# Patient Record
Sex: FEMALE | Race: WHITE | NOT HISPANIC OR LATINO | URBAN - METROPOLITAN AREA
[De-identification: names, ages, dates, MRNs, and addresses within clinical notes are randomized per-mention and may not be internally consistent; named-entity substitution may affect disease eponyms.]

---

## 2018-01-31 ENCOUNTER — OFFICE (OUTPATIENT)
Dept: URBAN - METROPOLITAN AREA CLINIC 75 | Facility: CLINIC | Age: 23
End: 2018-01-31

## 2018-01-31 VITALS
WEIGHT: 157 LBS | HEART RATE: 77 BPM | SYSTOLIC BLOOD PRESSURE: 130 MMHG | HEIGHT: 66 IN | DIASTOLIC BLOOD PRESSURE: 76 MMHG

## 2018-01-31 DIAGNOSIS — R14.0 ABDOMINAL DISTENSION (GASEOUS): ICD-10-CM

## 2018-01-31 DIAGNOSIS — R10.10 UPPER ABDOMINAL PAIN, UNSPECIFIED: ICD-10-CM

## 2018-01-31 PROCEDURE — 99204 OFFICE O/P NEW MOD 45 MIN: CPT | Performed by: INTERNAL MEDICINE

## 2018-01-31 RX ORDER — HYOSCYAMINE SULFATE 0.12 MG/1
TABLET ORAL
Qty: 60 | Refills: 10 | Status: ACTIVE
Start: 2018-01-31

## 2021-02-10 ENCOUNTER — OFFICE VISIT (OUTPATIENT)
Dept: INTERNAL MEDICINE | Facility: CLINIC | Age: 26
End: 2021-02-10

## 2021-02-10 VITALS
WEIGHT: 188 LBS | HEIGHT: 66 IN | BODY MASS INDEX: 30.22 KG/M2 | TEMPERATURE: 97.8 F | SYSTOLIC BLOOD PRESSURE: 110 MMHG | DIASTOLIC BLOOD PRESSURE: 70 MMHG

## 2021-02-10 DIAGNOSIS — M25.50 ARTHRALGIA, UNSPECIFIED JOINT: Primary | ICD-10-CM

## 2021-02-10 PROBLEM — F41.9 ANXIETY: Status: ACTIVE | Noted: 2021-02-10

## 2021-02-10 PROCEDURE — 99203 OFFICE O/P NEW LOW 30 MIN: CPT | Performed by: PHYSICIAN ASSISTANT

## 2021-02-10 RX ORDER — FERROUS SULFATE 325(65) MG
325 TABLET ORAL DAILY
COMMUNITY
Start: 2020-12-25 | End: 2021-03-29 | Stop reason: SDUPTHER

## 2021-02-10 RX ORDER — FLUOXETINE HYDROCHLORIDE 40 MG/1
CAPSULE ORAL
COMMUNITY
Start: 2021-02-08 | End: 2021-03-29 | Stop reason: SDUPTHER

## 2021-02-10 NOTE — PROGRESS NOTES
Subjective   Chief Complaint   Patient presents with   • Establish Care     NP       History of Present Illness     Pt is a 25 yr old white female with anxiety, depression and iron deficiency anemia. She is taking sprintec daily and had a pap smear in January of 2020 that was normal. Her periods are normal and regular. She is taking Prozac 40 mg daily and her symptoms are well controlled.     She takes otc iron daily as well for iron deficiency anemia.     End of 2018 early 2019 started having joint pain and fatigue. She then had some painful red nodules on her skin that were warm to the touch. She never had the rash on her torso stayed on her upper and lower extremities. She had worsening joint pain with this and was taken to the ER she was hospitalized and treated with steroids and it resolved and then came back and was much worse she had this relapsing remitting pattern for about 4 months.     She saw rheumatology, Dr. Fulton,  and they did not think it was lupus or RA.    She had several physicians tell her she had erythema nodosum.     She saw Dr. Urban, dermatology, but they were never able to get her into the office to do a biopsy at the time of the rash.     She continues to have joint pain and occasional rash that appears. She has fatigue no matter how much sleep she gets. She has not seen rheum in a year.     Patient Active Problem List   Diagnosis   • Anxiety       Allergies   Allergen Reactions   • Spironolactone Hives and Diarrhea       Current Outpatient Medications on File Prior to Visit   Medication Sig Dispense Refill   • ferrous sulfate 325 (65 FE) MG tablet Take 325 mg by mouth Daily.     • FLUoxetine (PROzac) 40 MG capsule TAKE 1 CAPSULE DAILY. TAKE WITH 20 MG FOR A DAILY DOSEOF 60 MG.     • Sprintec 28 0.25-35 MG-MCG per tablet        No current facility-administered medications on file prior to visit.        History reviewed. No pertinent past medical history.    Family History   Problem  "Relation Age of Onset   • Hypertension Mother    • Hyperlipidemia Mother    • Thyroid disease Mother    • Hypertension Father    • Hyperlipidemia Father    • Thyroid disease Maternal Aunt    • Cancer Maternal Grandmother         breast   • Alzheimer's disease Maternal Grandmother         60's   • Cancer Maternal Grandfather         thyroid       Social History     Socioeconomic History   • Marital status: Single     Spouse name: Not on file   • Number of children: Not on file   • Years of education: Not on file   • Highest education level: Not on file   Tobacco Use   • Smoking status: Never Smoker   Substance and Sexual Activity   • Alcohol use: Never     Frequency: Never   • Drug use: Never   • Sexual activity: Defer       History reviewed. No pertinent surgical history.      The following portions of the patient's history were reviewed and updated as appropriate: problem list, allergies, current medications, past medical history, past family history, past social history and past surgical history.    Review of Systems   Constitution: Positive for malaise/fatigue.   Skin: Positive for rash.   Musculoskeletal: Positive for joint pain, joint swelling and stiffness.         There is no immunization history on file for this patient.    Objective   Vitals:    02/10/21 0831 02/10/21 0921   BP:  110/70   Temp: 97.8 °F (36.6 °C)    Weight: 85.3 kg (188 lb)    Height: 167.6 cm (66\")      Body mass index is 30.34 kg/m².  Physical Exam  Vitals signs reviewed.   Constitutional:       Appearance: Normal appearance.   HENT:      Head: Normocephalic and atraumatic.   Eyes:      Extraocular Movements: Extraocular movements intact.      Conjunctiva/sclera: Conjunctivae normal.      Pupils: Pupils are equal, round, and reactive to light.   Cardiovascular:      Rate and Rhythm: Normal rate and regular rhythm.      Heart sounds: Normal heart sounds.   Pulmonary:      Effort: Pulmonary effort is normal.      Breath sounds: Normal breath " sounds.   Neurological:      Mental Status: She is alert.   Psychiatric:         Mood and Affect: Mood normal.         Behavior: Behavior normal.         Thought Content: Thought content normal.         Judgment: Judgment normal.       Assessment/Plan   Diagnoses and all orders for this visit:    1. Arthralgia, unspecified joint (Primary)  -     Ambulatory Referral to Rheumatology    I am going to refer her to Dr. Swetha Callaway for second opinion of her ongoing arthralgias and rash.

## 2021-03-29 RX ORDER — FERROUS SULFATE 325(65) MG
325 TABLET ORAL DAILY
Qty: 30 TABLET | Refills: 11 | Status: SHIPPED | OUTPATIENT
Start: 2021-03-29 | End: 2021-04-05 | Stop reason: SDUPTHER

## 2021-03-29 RX ORDER — FLUOXETINE HYDROCHLORIDE 40 MG/1
40 CAPSULE ORAL DAILY
Qty: 30 CAPSULE | Refills: 2 | Status: SHIPPED | OUTPATIENT
Start: 2021-03-29 | End: 2021-04-21 | Stop reason: SDUPTHER

## 2021-03-29 NOTE — TELEPHONE ENCOUNTER
Caller: Paloma Enriquez    Relationship: Self    Best call back number: [phone]     Medication needed:   Requested Prescriptions     Pending Prescriptions Disp Refills   • ferrous sulfate 325 (65 FE) MG tablet 30 tablet 11     Sig: Take 1 tablet by mouth Daily.   • FLUoxetine (PROzac) 40 MG capsule         When do you need the refill by: 03/30/21    What additional details did the patient provide when requesting the medication: GETS SHIPPED THROUGH MAIL HAS ABOUT A WEEK FOR THE FLUOXETINE AND 4 DAYS ON THE FERROUS.    Does the patient have less than a 3 day supply:  [] Yes  [x] No    What is the patient's preferred pharmacy: [facility] - [place], [place] - [address] AT PORTAL TO REGISTERED [facility] - [phone]  - [phone] FX       PATIENT IS ALSO ASKING ABOUT THE REFERRAL FOR THE RHEUMATOLOGY IT HAS BEEN OVER A MONTH AND SHE STILL HAS NOT HEARD ANYTHING ABOUT THE REFERRAL OR APPT.

## 2021-04-05 RX ORDER — FERROUS SULFATE 325(65) MG
325 TABLET ORAL DAILY
Qty: 30 TABLET | Refills: 11 | Status: SHIPPED | OUTPATIENT
Start: 2021-04-05 | End: 2022-04-27

## 2021-04-05 NOTE — TELEPHONE ENCOUNTER
Caller: HAN GUTIERREZ    Relationship: Mother    Best call back number:[phone]    Medication needed:   Requested Prescriptions     Pending Prescriptions Disp Refills   • ferrous sulfate 325 (65 FE) MG tablet 30 tablet 11     Sig: Take 1 tablet by mouth Daily.       When do you need the refill by: ASAP    What additional details did the patient provide when requesting the medication: PATIENT REQUESTED THIS THROUGH MAIL ORDER BUT THEY WOULD NOT FILL IT. PATIENT IS OUT OF MEDS.     Does the patient have less than a 3 day supply:  [x] Yes  [] No    What is the patient's preferred pharmacy: [address] - [phone]  - [phone] FX

## 2021-04-16 ENCOUNTER — BULK ORDERING (OUTPATIENT)
Dept: CASE MANAGEMENT | Facility: OTHER | Age: 26
End: 2021-04-16

## 2021-04-16 DIAGNOSIS — Z23 IMMUNIZATION DUE: ICD-10-CM

## 2021-04-21 DIAGNOSIS — F41.9 ANXIETY: Primary | ICD-10-CM

## 2021-04-21 RX ORDER — FLUOXETINE HYDROCHLORIDE 40 MG/1
40 CAPSULE ORAL DAILY
Qty: 90 CAPSULE | Refills: 1 | Status: SHIPPED | OUTPATIENT
Start: 2021-04-21 | End: 2021-09-14

## 2021-05-12 ENCOUNTER — OFFICE VISIT (OUTPATIENT)
Dept: INTERNAL MEDICINE | Facility: CLINIC | Age: 26
End: 2021-05-12

## 2021-05-12 VITALS
WEIGHT: 185 LBS | SYSTOLIC BLOOD PRESSURE: 100 MMHG | HEIGHT: 66 IN | TEMPERATURE: 97.3 F | DIASTOLIC BLOOD PRESSURE: 60 MMHG | BODY MASS INDEX: 29.73 KG/M2

## 2021-05-12 DIAGNOSIS — E03.8 HYPOTHYROIDISM DUE TO HASHIMOTO'S THYROIDITIS: ICD-10-CM

## 2021-05-12 DIAGNOSIS — M25.50 ARTHRALGIA, UNSPECIFIED JOINT: Primary | ICD-10-CM

## 2021-05-12 DIAGNOSIS — F41.9 ANXIETY: ICD-10-CM

## 2021-05-12 DIAGNOSIS — E06.3 HYPOTHYROIDISM DUE TO HASHIMOTO'S THYROIDITIS: ICD-10-CM

## 2021-05-12 DIAGNOSIS — Z11.59 NEED FOR HEPATITIS C SCREENING TEST: ICD-10-CM

## 2021-05-12 DIAGNOSIS — Z00.00 HEALTHCARE MAINTENANCE: ICD-10-CM

## 2021-05-12 PROBLEM — E03.9 HYPOTHYROIDISM: Status: ACTIVE | Noted: 2021-05-12

## 2021-05-12 PROCEDURE — 99395 PREV VISIT EST AGE 18-39: CPT | Performed by: PHYSICIAN ASSISTANT

## 2021-05-12 RX ORDER — LEVOTHYROXINE SODIUM 0.07 MG/1
75 TABLET ORAL
COMMUNITY
Start: 2021-03-02

## 2021-05-12 NOTE — PROGRESS NOTES
Subjective   Chief Complaint   Patient presents with   • Annual Exam       History of Present Illness     Pt is a 25 year old female who presents today for CPE. She sees GYN had a pap/pelvic 18 months ago.     She has had no lesions for the last year. She was started on Levothyroxine by Dr. Page, endocrinologist. She just had a follow up with him last week. He did not adjusted her dose due to her being euthyroid, she follows up in 6 months. She is on branded Synthroid due to side effects with her mental health from generic Levothyroxine.      Patient Active Problem List   Diagnosis   • Anxiety   • Hypothyroidism   • Hashimoto's disease       Allergies   Allergen Reactions   • Spironolactone Hives and Diarrhea       Current Outpatient Medications on File Prior to Visit   Medication Sig Dispense Refill   • ferrous sulfate 325 (65 FE) MG tablet Take 1 tablet by mouth Daily. 30 tablet 11   • FLUoxetine (PROzac) 40 MG capsule Take 1 capsule by mouth Daily. 90 capsule 1   • levothyroxine (Synthroid) 75 MCG tablet Take 75 mcg by mouth.     • Sprintec 28 0.25-35 MG-MCG per tablet        No current facility-administered medications on file prior to visit.       Past Medical History:   Diagnosis Date   • Hashimoto's disease        Family History   Problem Relation Age of Onset   • Hypertension Mother    • Hyperlipidemia Mother    • Thyroid disease Mother    • Hypertension Father    • Hyperlipidemia Father    • Thyroid disease Maternal Aunt    • Cancer Maternal Grandmother         breast   • Alzheimer's disease Maternal Grandmother         60's   • Cancer Maternal Grandfather         thyroid       Social History     Socioeconomic History   • Marital status: Single     Spouse name: Not on file   • Number of children: Not on file   • Years of education: Not on file   • Highest education level: Not on file   Tobacco Use   • Smoking status: Never Smoker   • Smokeless tobacco: Never Used   Vaping Use   • Vaping Use: Never used  "  Substance and Sexual Activity   • Alcohol use: Never   • Drug use: Never   • Sexual activity: Defer       Past Surgical History:   Procedure Laterality Date   • WISDOM TOOTH EXTRACTION       The following portions of the patient's history were reviewed and updated as appropriate: problem list, allergies, current medications, past medical history, past family history, past social history and past surgical history.    Review of Systems   Constitutional: Negative for chills, decreased appetite, fever, weight gain and weight loss.   HENT: Negative for congestion, ear pain, hearing loss, hoarse voice and sore throat.    Eyes: Negative for blurred vision and double vision.   Cardiovascular: Negative for chest pain, dyspnea on exertion, irregular heartbeat, leg swelling and palpitations.   Respiratory: Negative for cough, shortness of breath, snoring and wheezing.    Endocrine: Negative for polydipsia and polyuria.   Skin: Negative for rash and suspicious lesions.   Musculoskeletal: Positive for joint pain. Negative for joint swelling, muscle cramps and stiffness.   Gastrointestinal: Negative for abdominal pain, change in bowel habit, constipation, diarrhea, heartburn, hematochezia, melena, nausea and vomiting.   Genitourinary: Negative for bladder incontinence, non-menstrual bleeding and pelvic pain.   Neurological: Negative for dizziness, headaches, light-headedness, numbness, paresthesias and tremors.   Psychiatric/Behavioral: Negative for depression, memory loss and suicidal ideas. The patient does not have insomnia and is not nervous/anxious.        Immunization History   Administered Date(s) Administered   • COVID-19 (MODERNA) 04/14/2021   • HPV Quadrivalent 11/21/2013   • Influenza, Unspecified 01/05/2019   • Tdap 03/05/2020       Objective   Vitals:    05/12/21 1011 05/12/21 1040   BP:  100/60   Temp: 97.3 °F (36.3 °C)    Weight: 83.9 kg (185 lb)    Height: 167.6 cm (66\")      Body mass index is 29.86 " kg/m².  Physical Exam  Vitals reviewed.   Constitutional:       Appearance: She is well-developed.   HENT:      Head: Normocephalic and atraumatic.      Right Ear: Ear canal normal.      Left Ear: Ear canal normal.      Mouth/Throat:      Mouth: Mucous membranes are moist.      Pharynx: Oropharynx is clear.   Eyes:      Extraocular Movements: Extraocular movements intact.      Conjunctiva/sclera: Conjunctivae normal.      Pupils: Pupils are equal, round, and reactive to light.   Neck:      Thyroid: No thyromegaly.   Cardiovascular:      Rate and Rhythm: Normal rate and regular rhythm.      Heart sounds: Normal heart sounds. No murmur heard.     Pulmonary:      Effort: Pulmonary effort is normal.      Breath sounds: Normal breath sounds.   Abdominal:      General: There is no distension.      Palpations: Abdomen is soft. There is no mass.      Tenderness: There is no abdominal tenderness. There is no rebound.   Musculoskeletal:      Cervical back: Neck supple.   Lymphadenopathy:      Cervical: No cervical adenopathy.   Skin:     General: Skin is warm.   Neurological:      Mental Status: She is alert.   Psychiatric:         Mood and Affect: Mood normal.         Behavior: Behavior normal.         Thought Content: Thought content normal.         Judgment: Judgment normal.       Assessment/Plan   Diagnoses and all orders for this visit:    1. Arthralgia, unspecified joint (Primary)  -     Ambulatory Referral to Rheumatology    2. Hypothyroidism due to Hashimoto's thyroiditis  Comments:  Managed by Dr. Camilo brownlee    3. Anxiety  Comments:  Continue Prozac 40 mg and seeing therapist regularly    4. Healthcare maintenance  -     Comprehensive Metabolic Panel; Future  -     Lipid Panel With / Chol / HDL Ratio; Future    5. Need for hepatitis C screening test  -     Hepatitis C Antibody; Future        Return in about 1 year (around 5/12/2022) for Lab Appt Before FUP.

## 2021-09-13 DIAGNOSIS — F41.9 ANXIETY: ICD-10-CM

## 2021-09-14 RX ORDER — FLUOXETINE HYDROCHLORIDE 40 MG/1
CAPSULE ORAL
Qty: 90 CAPSULE | Refills: 1 | Status: SHIPPED | OUTPATIENT
Start: 2021-09-14 | End: 2022-02-07

## 2022-02-05 DIAGNOSIS — F41.9 ANXIETY: ICD-10-CM

## 2022-02-07 RX ORDER — FLUOXETINE HYDROCHLORIDE 40 MG/1
CAPSULE ORAL
Qty: 90 CAPSULE | Refills: 1 | Status: SHIPPED | OUTPATIENT
Start: 2022-02-07 | End: 2022-06-01

## 2022-04-27 RX ORDER — FERROUS SULFATE 325(65) MG
TABLET ORAL
Qty: 30 TABLET | Refills: 11 | Status: SHIPPED | OUTPATIENT
Start: 2022-04-27 | End: 2022-05-16 | Stop reason: SDUPTHER

## 2022-05-16 ENCOUNTER — OFFICE VISIT (OUTPATIENT)
Dept: INTERNAL MEDICINE | Facility: CLINIC | Age: 27
End: 2022-05-16

## 2022-05-16 VITALS
DIASTOLIC BLOOD PRESSURE: 61 MMHG | BODY MASS INDEX: 30.53 KG/M2 | SYSTOLIC BLOOD PRESSURE: 102 MMHG | WEIGHT: 190 LBS | TEMPERATURE: 96.9 F | HEIGHT: 66 IN

## 2022-05-16 DIAGNOSIS — E06.3 HYPOTHYROIDISM DUE TO HASHIMOTO'S THYROIDITIS: Primary | ICD-10-CM

## 2022-05-16 DIAGNOSIS — F41.9 ANXIETY: ICD-10-CM

## 2022-05-16 DIAGNOSIS — Z00.00 HEALTHCARE MAINTENANCE: ICD-10-CM

## 2022-05-16 DIAGNOSIS — E03.8 HYPOTHYROIDISM DUE TO HASHIMOTO'S THYROIDITIS: Primary | ICD-10-CM

## 2022-05-16 DIAGNOSIS — E06.3 HASHIMOTO'S DISEASE: ICD-10-CM

## 2022-05-16 PROCEDURE — 99395 PREV VISIT EST AGE 18-39: CPT | Performed by: PHYSICIAN ASSISTANT

## 2022-05-16 RX ORDER — IMIQUIMOD 12.5 MG/.25G
CREAM TOPICAL DAILY PRN
COMMUNITY
Start: 2022-02-01

## 2022-05-16 RX ORDER — FERROUS SULFATE 325(65) MG
1 TABLET ORAL DAILY
Qty: 90 TABLET | Refills: 3 | Status: SHIPPED | OUTPATIENT
Start: 2022-05-16

## 2022-05-16 NOTE — PROGRESS NOTES
Subjective   Chief Complaint   Patient presents with   • Annual Exam       History of Present Illness     Pt is here today for CPE. She sees Dr. Olson for her pap/pelvic. She sees torrie Wagner, for her hypothyroidism. She has been seeing a therapist once a month. Anxiety is controlled. Continues to have joint pain, had negative workup with rheumatology. Would like to start exercising again regularly, something that is not so intense on her joints.      Patient Active Problem List   Diagnosis   • Anxiety   • Hypothyroidism   • Hashimoto's disease       Allergies   Allergen Reactions   • Cumin Oil Anaphylaxis   • Corn Oil Nausea And Vomiting   • Gluten Meal Nausea And Vomiting   • Latex Rash   • Spironolactone Hives, Diarrhea and Nausea And Vomiting       Current Outpatient Medications on File Prior to Visit   Medication Sig Dispense Refill   • FLUoxetine (PROzac) 40 MG capsule TAKE 1 CAPSULE DAILY 90 capsule 1   • imiquimod (ALDARA) 5 % cream Apply  topically to the appropriate area as directed Daily As Needed.     • levothyroxine (SYNTHROID, LEVOTHROID) 75 MCG tablet Take 75 mcg by mouth.     • Sprintec 28 0.25-35 MG-MCG per tablet Take 1 tablet by mouth Daily.     • [DISCONTINUED] FeroSul 325 (65 Fe) MG tablet TAKE ONE TABLET BY MOUTH DAILY 30 tablet 11     No current facility-administered medications on file prior to visit.       Past Medical History:   Diagnosis Date   • Hashimoto's disease        Family History   Problem Relation Age of Onset   • Hypertension Mother    • Hyperlipidemia Mother    • Thyroid disease Mother    • Hypertension Father    • Hyperlipidemia Father    • Thyroid disease Maternal Aunt    • Cancer Maternal Grandmother         breast   • Alzheimer's disease Maternal Grandmother         60's   • Cancer Maternal Grandfather         thyroid       Social History     Socioeconomic History   • Marital status: Single   Tobacco Use   • Smoking status: Never Smoker   • Smokeless tobacco:  Never Used   Vaping Use   • Vaping Use: Never used   Substance and Sexual Activity   • Alcohol use: Never   • Drug use: Never   • Sexual activity: Defer       Past Surgical History:   Procedure Laterality Date   • WISDOM TOOTH EXTRACTION           The following portions of the patient's history were reviewed and updated as appropriate: problem list, allergies, current medications, past medical history, past family history, past social history and past surgical history.    Review of Systems   Constitutional: Negative for chills, decreased appetite, fever, weight gain and weight loss.   HENT: Negative for congestion, ear pain, hearing loss, hoarse voice and sore throat.    Eyes: Negative for blurred vision and double vision.   Cardiovascular: Negative for chest pain, dyspnea on exertion, irregular heartbeat, leg swelling and palpitations.   Respiratory: Negative for cough, shortness of breath, snoring and wheezing.    Endocrine: Negative for polydipsia and polyuria.   Skin: Negative for rash and suspicious lesions.   Musculoskeletal: Positive for arthritis and joint pain. Negative for joint swelling, muscle cramps and stiffness.   Gastrointestinal: Negative for abdominal pain, change in bowel habit, constipation, diarrhea, heartburn, hematochezia, melena, nausea and vomiting.   Genitourinary: Negative for bladder incontinence, non-menstrual bleeding and pelvic pain.   Neurological: Negative for dizziness, headaches, light-headedness, numbness, paresthesias and tremors.   Psychiatric/Behavioral: Negative for depression, memory loss and suicidal ideas. The patient does not have insomnia and is not nervous/anxious.        Immunization History   Administered Date(s) Administered   • COVID-19 (MODERNA) 1st, 2nd, 3rd Dose Only 04/14/2021, 05/12/2021, 12/22/2021   • HPV Quadrivalent 11/21/2013   • Influenza, Unspecified 01/05/2019   • Tdap 03/05/2020       Objective   Vitals:    05/16/22 0804 05/16/22 0831   BP:  102/61  "  Temp: 96.9 °F (36.1 °C)    TempSrc: Infrared    Weight: 86.2 kg (190 lb)    Height: 167.6 cm (65.98\")      Body mass index is 30.68 kg/m².  Physical Exam  Vitals reviewed.   Constitutional:       Appearance: She is well-developed.   HENT:      Head: Normocephalic and atraumatic.      Mouth/Throat:      Mouth: Mucous membranes are moist.      Pharynx: Oropharynx is clear.   Eyes:      Extraocular Movements: Extraocular movements intact.      Conjunctiva/sclera: Conjunctivae normal.      Pupils: Pupils are equal, round, and reactive to light.   Neck:      Thyroid: No thyromegaly.   Cardiovascular:      Rate and Rhythm: Normal rate and regular rhythm.      Heart sounds: Normal heart sounds. No murmur heard.  Pulmonary:      Effort: Pulmonary effort is normal.      Breath sounds: Normal breath sounds.   Abdominal:      General: There is no distension.      Palpations: Abdomen is soft. There is no mass.      Tenderness: There is no abdominal tenderness. There is no rebound.   Musculoskeletal:      Cervical back: Neck supple.   Lymphadenopathy:      Cervical: No cervical adenopathy.   Skin:     General: Skin is warm.   Neurological:      Mental Status: She is alert.   Psychiatric:         Behavior: Behavior normal.           Assessment & Plan   Diagnoses and all orders for this visit:    1. Hypothyroidism due to Hashimoto's thyroiditis (Primary)    2. Hashimoto's disease    3. Anxiety    4. Healthcare maintenance    Other orders  -     ferrous sulfate (FeroSul) 325 (65 FE) MG tablet; Take 1 tablet by mouth Daily.  Dispense: 90 tablet; Refill: 3    1. HM: Pap utd with Dr. Blake. She will send dates of her Gardasil vaccinations to the office. Recommended trying yoga for exercise as less intensity on joints.     2. Hypothyroidism due to Hashimoto's disease: Managed by endocrinology, Dr. Page.     3. Anxiety: Continue Prozac 40 mg daily and therapy monthly.     Return in about 1 year (around 5/16/2023) for Lab " Appt Before FUP, Annual physical.

## 2022-05-26 ENCOUNTER — TELEPHONE (OUTPATIENT)
Dept: INTERNAL MEDICINE | Facility: CLINIC | Age: 27
End: 2022-05-26

## 2022-05-26 NOTE — TELEPHONE ENCOUNTER
PATIENT RECEIVED A LETTER FROM [facility] LETTING HER KNOW THAT ferrous sulfate (FeroSul) 325 (65 FE) MG tablet WAS DENIED AND PRESCRIBER NEEDS TO APPEAL THAT DECISION  THE NUMBER TO CALL TO APPEAL IS [phone]    PLEASE ADVISE    [phone]

## 2022-05-31 DIAGNOSIS — F41.9 ANXIETY: ICD-10-CM

## 2022-06-01 RX ORDER — FLUOXETINE HYDROCHLORIDE 40 MG/1
CAPSULE ORAL
Qty: 90 CAPSULE | Refills: 1 | Status: SHIPPED | OUTPATIENT
Start: 2022-06-01 | End: 2022-12-16

## 2022-06-13 ENCOUNTER — TELEPHONE (OUTPATIENT)
Dept: INTERNAL MEDICINE | Facility: CLINIC | Age: 27
End: 2022-06-13

## 2022-08-15 ENCOUNTER — OFFICE VISIT (OUTPATIENT)
Dept: INTERNAL MEDICINE | Facility: CLINIC | Age: 27
End: 2022-08-15

## 2022-08-15 DIAGNOSIS — L72.3 SEBACEOUS CYST: Primary | ICD-10-CM

## 2022-08-15 PROCEDURE — 99213 OFFICE O/P EST LOW 20 MIN: CPT | Performed by: PHYSICIAN ASSISTANT

## 2022-08-15 RX ORDER — DOXYCYCLINE HYCLATE 100 MG/1
100 CAPSULE ORAL 2 TIMES DAILY
Qty: 10 CAPSULE | Refills: 0 | Status: SHIPPED | OUTPATIENT
Start: 2022-08-15 | End: 2022-08-20

## 2022-08-15 NOTE — PROGRESS NOTES
Subjective   Chief Complaint   Patient presents with   • Rash     Face, arms     History of Present Illness      Pt is here today with cc of painful enlarged cyst on forehead for the last few days. Has also had a rash on her arms, chest and abdomen. Rash has now resolved. Used cortisone cream and it helped.   Patient Active Problem List   Diagnosis   • Anxiety   • Hypothyroidism   • Hashimoto's disease       Allergies   Allergen Reactions   • Cumin Oil Anaphylaxis   • Corn Oil Nausea And Vomiting   • Gluten Meal Nausea And Vomiting   • Latex Rash   • Spironolactone Hives, Diarrhea and Nausea And Vomiting       Current Outpatient Medications on File Prior to Visit   Medication Sig Dispense Refill   • ferrous sulfate (FeroSul) 325 (65 FE) MG tablet Take 1 tablet by mouth Daily. 90 tablet 3   • FLUoxetine (PROzac) 40 MG capsule TAKE 1 CAPSULE DAILY 90 capsule 1   • imiquimod (ALDARA) 5 % cream Apply  topically to the appropriate area as directed Daily As Needed.     • levothyroxine (SYNTHROID, LEVOTHROID) 75 MCG tablet Take 75 mcg by mouth.     • Sprintec 28 0.25-35 MG-MCG per tablet Take 1 tablet by mouth Daily.       No current facility-administered medications on file prior to visit.       Past Medical History:   Diagnosis Date   • Hashimoto's disease        Family History   Problem Relation Age of Onset   • Hypertension Mother    • Hyperlipidemia Mother    • Thyroid disease Mother    • Hypertension Father    • Hyperlipidemia Father    • Thyroid disease Maternal Aunt    • Cancer Maternal Grandmother         breast   • Alzheimer's disease Maternal Grandmother         60's   • Cancer Maternal Grandfather         thyroid       Social History     Socioeconomic History   • Marital status: Single   Tobacco Use   • Smoking status: Never Smoker   • Smokeless tobacco: Never Used   Vaping Use   • Vaping Use: Never used   Substance and Sexual Activity   • Alcohol use: Never   • Drug use: Never   • Sexual activity: Defer  "      Past Surgical History:   Procedure Laterality Date   • WISDOM TOOTH EXTRACTION           The following portions of the patient's history were reviewed and updated as appropriate: problem list, allergies, current medications, past medical history, past family history, past social history and past surgical history.    ROS     See HPI    Immunization History   Administered Date(s) Administered   • COVID-19 (MODERNA) 1st, 2nd, 3rd Dose Only 04/14/2021, 05/12/2021, 12/22/2021   • HPV Quadrivalent 11/21/2013   • Influenza, Unspecified 01/05/2019   • Tdap 03/05/2020       Objective   Vitals:    08/15/22 1519 08/16/22 1457   BP:  122/74   Temp: 98.4 °F (36.9 °C)    Weight: 85.7 kg (189 lb)    Height: 167.6 cm (65.98\")      Body mass index is 30.52 kg/m².  Physical Exam  Vitals reviewed.   Constitutional:       Appearance: Normal appearance.   HENT:      Head: Normocephalic and atraumatic.   Skin:     Comments: Dime sized inflamed sebaceous cyst central forehead   Neurological:      Mental Status: She is alert.           Assessment & Plan   Diagnoses and all orders for this visit:    1. Sebaceous cyst (Primary)  -     doxycycline (VIBRAMYCIN) 100 MG capsule; Take 1 capsule by mouth 2 (Two) Times a Day for 5 days.  Dispense: 10 capsule; Refill: 0        No follow-ups on file.           "

## 2022-08-16 VITALS
SYSTOLIC BLOOD PRESSURE: 122 MMHG | BODY MASS INDEX: 30.37 KG/M2 | WEIGHT: 189 LBS | DIASTOLIC BLOOD PRESSURE: 74 MMHG | TEMPERATURE: 98.4 F | HEIGHT: 66 IN

## 2022-10-31 ENCOUNTER — OFFICE VISIT (OUTPATIENT)
Dept: INTERNAL MEDICINE | Facility: CLINIC | Age: 27
End: 2022-10-31

## 2022-10-31 VITALS
BODY MASS INDEX: 30.7 KG/M2 | SYSTOLIC BLOOD PRESSURE: 110 MMHG | WEIGHT: 191 LBS | TEMPERATURE: 97.8 F | DIASTOLIC BLOOD PRESSURE: 60 MMHG | HEIGHT: 66 IN

## 2022-10-31 DIAGNOSIS — L65.9 HAIR THINNING: Primary | ICD-10-CM

## 2022-10-31 DIAGNOSIS — M25.50 ARTHRALGIA, UNSPECIFIED JOINT: Primary | ICD-10-CM

## 2022-10-31 DIAGNOSIS — L65.9 HAIR THINNING: ICD-10-CM

## 2022-10-31 PROCEDURE — 99214 OFFICE O/P EST MOD 30 MIN: CPT | Performed by: PHYSICIAN ASSISTANT

## 2022-10-31 NOTE — PROGRESS NOTES
Subjective   Chief Complaint   Patient presents with   • hair thinning       History of Present Illness      She states with the hashiomoto's she has had worsened joint pain lately in her hands, wrists and elbows. Feels her hair is thinning again. Does not feel she ever has relief from her joint sx. Has stiffness in the AM and takes some time to loosen. Worse on days when she has to go work. Does not take anti inflammatories often. If really bad will use the voltaren gel once every 2 weeks.  Unable to  Having a lot of bloaiting and painful bloating where her stomach feels hard.   Patient Active Problem List   Diagnosis   • Anxiety   • Hypothyroidism   • Hashimoto's disease       Allergies   Allergen Reactions   • Cumin Oil Anaphylaxis   • Corn Oil Nausea And Vomiting   • Gluten Meal Nausea And Vomiting   • Latex Rash   • Spironolactone Hives, Diarrhea and Nausea And Vomiting       Current Outpatient Medications on File Prior to Visit   Medication Sig Dispense Refill   • ferrous sulfate (FeroSul) 325 (65 FE) MG tablet Take 1 tablet by mouth Daily. 90 tablet 3   • FLUoxetine (PROzac) 40 MG capsule TAKE 1 CAPSULE DAILY 90 capsule 1   • imiquimod (ALDARA) 5 % cream Apply  topically to the appropriate area as directed Daily As Needed.     • levothyroxine (SYNTHROID, LEVOTHROID) 75 MCG tablet Take 75 mcg by mouth.     • Sprintec 28 0.25-35 MG-MCG per tablet Take 1 tablet by mouth Daily.       No current facility-administered medications on file prior to visit.       Past Medical History:   Diagnosis Date   • Hashimoto's disease        Family History   Problem Relation Age of Onset   • Hypertension Mother    • Hyperlipidemia Mother    • Thyroid disease Mother    • Hypertension Father    • Hyperlipidemia Father    • Thyroid disease Maternal Aunt    • Cancer Maternal Grandmother         breast   • Alzheimer's disease Maternal Grandmother         60's   • Cancer Maternal Grandfather         thyroid       Social History  "    Socioeconomic History   • Marital status: Single   Tobacco Use   • Smoking status: Never   • Smokeless tobacco: Never   Vaping Use   • Vaping Use: Never used   Substance and Sexual Activity   • Alcohol use: Never   • Drug use: Never   • Sexual activity: Defer       Past Surgical History:   Procedure Laterality Date   • WISDOM TOOTH EXTRACTION           The following portions of the patient's history were reviewed and updated as appropriate: problem list, allergies, current medications, past medical history, past family history, past social history and past surgical history.    ROS     See HPI    Immunization History   Administered Date(s) Administered   • COVID-19 (MODERNA) 1st, 2nd, 3rd Dose Only 04/14/2021, 05/12/2021, 12/22/2021   • HPV Quadrivalent 11/21/2013   • Influenza, Unspecified 01/05/2019   • Tdap 03/05/2020       Objective   Vitals:    10/31/22 1105 10/31/22 1123   BP:  110/60   Temp: 97.8 °F (36.6 °C)    Weight: 86.6 kg (191 lb)    Height: 167.6 cm (65.98\")      Body mass index is 30.84 kg/m².  Physical Exam  Vitals reviewed.   Constitutional:       Appearance: Normal appearance.   HENT:      Head: Normocephalic and atraumatic.   Cardiovascular:      Rate and Rhythm: Normal rate and regular rhythm.   Musculoskeletal:         General: No swelling.   Neurological:      Mental Status: She is alert.           Assessment & Plan   Diagnoses and all orders for this visit:    1. Arthralgia, unspecified joint (Primary)  -     URBAN by IFA, Reflex 9-biomarkers profile  -     Vitamin D,25-Hydroxy  -     Rheumatoid Factor  -     Cyclic Citrul Peptide Antibody, IgG / IgA  -     C-reactive Protein  -     Sedimentation Rate    2. Hair thinning  -     TSH  -     T4, Free  -     Vitamin B12  -     CBC & Differential  -     Ferritin    Will workup causes today for her thinning hair. Re work up arthralgias as well. Plan to start Meloxicam 7.5 mg daily for sx.     Return in about 6 weeks (around 12/12/2022) for Lab " Today.

## 2022-11-03 LAB
25(OH)D3+25(OH)D2 SERPL-MCNC: 51 NG/ML (ref 30–100)
ANA SER QL IF: NEGATIVE
BASOPHILS # BLD AUTO: 0.04 10*3/MM3 (ref 0–0.2)
BASOPHILS NFR BLD AUTO: 0.6 % (ref 0–1.5)
CCP IGA+IGG SERPL IA-ACNC: 3 UNITS (ref 0–19)
CRP SERPL-MCNC: 0.36 MG/DL (ref 0–0.5)
EOSINOPHIL # BLD AUTO: 0.07 10*3/MM3 (ref 0–0.4)
EOSINOPHIL NFR BLD AUTO: 1 % (ref 0.3–6.2)
ERYTHROCYTE [DISTWIDTH] IN BLOOD BY AUTOMATED COUNT: 12.6 % (ref 12.3–15.4)
ERYTHROCYTE [SEDIMENTATION RATE] IN BLOOD BY WESTERGREN METHOD: 3 MM/HR (ref 0–20)
FERRITIN SERPL-MCNC: 46.1 NG/ML (ref 13–150)
HCT VFR BLD AUTO: 40.4 % (ref 34–46.6)
HGB BLD-MCNC: 13.3 G/DL (ref 12–15.9)
IMM GRANULOCYTES # BLD AUTO: 0.02 10*3/MM3 (ref 0–0.05)
IMM GRANULOCYTES NFR BLD AUTO: 0.3 % (ref 0–0.5)
LABORATORY COMMENT REPORT: NORMAL
LYMPHOCYTES # BLD AUTO: 2.3 10*3/MM3 (ref 0.7–3.1)
LYMPHOCYTES NFR BLD AUTO: 33.4 % (ref 19.6–45.3)
MCH RBC QN AUTO: 27.1 PG (ref 26.6–33)
MCHC RBC AUTO-ENTMCNC: 32.9 G/DL (ref 31.5–35.7)
MCV RBC AUTO: 82.3 FL (ref 79–97)
MONOCYTES # BLD AUTO: 0.55 10*3/MM3 (ref 0.1–0.9)
MONOCYTES NFR BLD AUTO: 8 % (ref 5–12)
NEUTROPHILS # BLD AUTO: 3.91 10*3/MM3 (ref 1.7–7)
NEUTROPHILS NFR BLD AUTO: 56.7 % (ref 42.7–76)
NRBC BLD AUTO-RTO: 0 /100 WBC (ref 0–0.2)
PLATELET # BLD AUTO: 336 10*3/MM3 (ref 140–450)
RBC # BLD AUTO: 4.91 10*6/MM3 (ref 3.77–5.28)
RHEUMATOID FACT SERPL-ACNC: <10 IU/ML
T4 FREE SERPL-MCNC: 1.44 NG/DL (ref 0.93–1.7)
TSH SERPL DL<=0.005 MIU/L-ACNC: 0.4 UIU/ML (ref 0.27–4.2)
VIT B12 SERPL-MCNC: 260 PG/ML (ref 211–946)
WBC # BLD AUTO: 6.89 10*3/MM3 (ref 3.4–10.8)

## 2022-12-13 ENCOUNTER — OFFICE VISIT (OUTPATIENT)
Dept: INTERNAL MEDICINE | Facility: CLINIC | Age: 27
End: 2022-12-13

## 2022-12-13 VITALS
BODY MASS INDEX: 30.53 KG/M2 | WEIGHT: 190 LBS | SYSTOLIC BLOOD PRESSURE: 110 MMHG | DIASTOLIC BLOOD PRESSURE: 60 MMHG | HEIGHT: 66 IN | TEMPERATURE: 97.8 F

## 2022-12-13 DIAGNOSIS — E53.8 B12 DEFICIENCY: Primary | ICD-10-CM

## 2022-12-13 DIAGNOSIS — M25.50 ARTHRALGIA, UNSPECIFIED JOINT: ICD-10-CM

## 2022-12-13 PROCEDURE — 96372 THER/PROPH/DIAG INJ SC/IM: CPT | Performed by: PHYSICIAN ASSISTANT

## 2022-12-13 PROCEDURE — 99214 OFFICE O/P EST MOD 30 MIN: CPT | Performed by: PHYSICIAN ASSISTANT

## 2022-12-13 RX ORDER — CYANOCOBALAMIN 1000 UG/ML
1000 INJECTION, SOLUTION INTRAMUSCULAR; SUBCUTANEOUS
Status: SHIPPED | OUTPATIENT
Start: 2022-12-13

## 2022-12-13 RX ORDER — LANOLIN ALCOHOL/MO/W.PET/CERES
1000 CREAM (GRAM) TOPICAL DAILY
COMMUNITY

## 2022-12-13 RX ADMIN — CYANOCOBALAMIN 1000 MCG: 1000 INJECTION, SOLUTION INTRAMUSCULAR; SUBCUTANEOUS at 09:45

## 2022-12-13 NOTE — PROGRESS NOTES
Subjective   Chief Complaint   Patient presents with   • Joint Pain     F/U       History of Present Illness      Not ready to start the antiinflammatory. Has episodes when her joints are worse and other times better. Doing fairly well right now. 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 She has tried but has terrible joint pain and inflammation for a day or two after. She is physically unable to do this. She did start B12 supplement daily.     Patient Active Problem List   Diagnosis   • Anxiety   • Hypothyroidism   • Hashimoto's disease   • Arthralgia       Allergies   Allergen Reactions   • Cumin Oil Anaphylaxis   • Corn Oil Nausea And Vomiting   • Gluten Meal Nausea And Vomiting   • Latex Rash   • Spironolactone Hives, Diarrhea and Nausea And Vomiting       Current Outpatient Medications on File Prior to Visit   Medication Sig Dispense Refill   • ferrous sulfate (FeroSul) 325 (65 FE) MG tablet Take 1 tablet by mouth Daily. 90 tablet 3   • FLUoxetine (PROzac) 40 MG capsule TAKE 1 CAPSULE DAILY 90 capsule 1   • imiquimod (ALDARA) 5 % cream Apply  topically to the appropriate area as directed Daily As Needed.     • levothyroxine (SYNTHROID, LEVOTHROID) 75 MCG tablet Take 75 mcg by mouth.     • Sprintec 28 0.25-35 MG-MCG per tablet Take 1 tablet by mouth Daily.     • vitamin B-12 (CYANOCOBALAMIN) 1000 MCG tablet Take 1,000 mcg by mouth Daily.       No current facility-administered medications on file prior to visit.       Past Medical History:   Diagnosis Date   • Hashimoto's disease        Family History   Problem Relation Age of Onset   • Hypertension Mother    • Hyperlipidemia Mother    • Thyroid disease Mother    • Hypertension Father    • Hyperlipidemia Father    • Thyroid disease Maternal Aunt    • Cancer Maternal Grandmother         breast   • Alzheimer's disease Maternal Grandmother         60's   • Cancer Maternal Grandfather         thyroid  "      Social History     Socioeconomic History   • Marital status: Single   Tobacco Use   • Smoking status: Never   • Smokeless tobacco: Never   Vaping Use   • Vaping Use: Never used   Substance and Sexual Activity   • Alcohol use: Never   • Drug use: Never   • Sexual activity: Defer       Past Surgical History:   Procedure Laterality Date   • WISDOM TOOTH EXTRACTION           The following portions of the patient's history were reviewed and updated as appropriate: problem list, allergies, current medications, past medical history, past family history, past social history and past surgical history.    ROS     See HPI    Immunization History   Administered Date(s) Administered   • COVID-19 (MODERNA) 1st, 2nd, 3rd Dose Only 04/14/2021, 05/12/2021, 12/22/2021   • HPV Quadrivalent 11/21/2013   • Influenza, Unspecified 01/05/2019   • Tdap 03/05/2020       Objective   Vitals:    12/13/22 0912 12/13/22 0926   BP:  110/60   Temp: 97.8 °F (36.6 °C)    Weight: 86.2 kg (190 lb)    Height: 167.6 cm (65.98\")      Body mass index is 30.68 kg/m².  Physical Exam  Vitals reviewed.   Constitutional:       Appearance: Normal appearance.   HENT:      Head: Normocephalic and atraumatic.   Cardiovascular:      Rate and Rhythm: Normal rate and regular rhythm.      Heart sounds: Normal heart sounds.   Neurological:      Mental Status: She is alert.       Assessment & Plan   Diagnoses and all orders for this visit:    1. B12 deficiency (Primary)  -     cyanocobalamin injection 1,000 mcg    2. Arthralgia, unspecified joint    Will wait on starting daily nsaid at this time. Did discuss possibly switching her antidepressant to Cymbalta in the future if she was interested to help with her joint pain. B12 injection today and for the next 2 months. Keep CPE in May.              "

## 2022-12-16 DIAGNOSIS — F41.9 ANXIETY: Primary | ICD-10-CM

## 2022-12-16 RX ORDER — FLUOXETINE 10 MG/1
10 CAPSULE ORAL DAILY
Qty: 14 CAPSULE | Refills: 0 | Status: SHIPPED | OUTPATIENT
Start: 2022-12-16

## 2022-12-16 RX ORDER — FLUOXETINE HYDROCHLORIDE 20 MG/1
20 CAPSULE ORAL DAILY
Qty: 14 CAPSULE | Refills: 0 | Status: SHIPPED | OUTPATIENT
Start: 2022-12-16

## 2023-01-24 RX ORDER — DULOXETIN HYDROCHLORIDE 30 MG/1
30 CAPSULE, DELAYED RELEASE ORAL DAILY
Qty: 90 CAPSULE | Refills: 1 | Status: SHIPPED | OUTPATIENT
Start: 2023-01-24

## 2023-02-10 ENCOUNTER — CLINICAL SUPPORT (OUTPATIENT)
Dept: INTERNAL MEDICINE | Facility: CLINIC | Age: 28
End: 2023-02-10
Payer: COMMERCIAL

## 2023-02-10 PROCEDURE — 96372 THER/PROPH/DIAG INJ SC/IM: CPT | Performed by: PHYSICIAN ASSISTANT

## 2023-02-10 RX ADMIN — CYANOCOBALAMIN 1000 MCG: 1000 INJECTION, SOLUTION INTRAMUSCULAR; SUBCUTANEOUS at 08:25

## 2023-03-17 ENCOUNTER — CLINICAL SUPPORT (OUTPATIENT)
Dept: INTERNAL MEDICINE | Facility: CLINIC | Age: 28
End: 2023-03-17
Payer: COMMERCIAL

## 2023-03-17 DIAGNOSIS — E53.8 B12 DEFICIENCY: ICD-10-CM

## 2023-03-17 PROCEDURE — 96372 THER/PROPH/DIAG INJ SC/IM: CPT | Performed by: PHYSICIAN ASSISTANT

## 2023-03-17 RX ADMIN — CYANOCOBALAMIN 1000 MCG: 1000 INJECTION, SOLUTION INTRAMUSCULAR; SUBCUTANEOUS at 08:28

## 2023-05-16 DIAGNOSIS — E06.3 HASHIMOTO'S DISEASE: ICD-10-CM

## 2023-05-16 DIAGNOSIS — L65.9 HAIR THINNING: ICD-10-CM

## 2023-05-16 DIAGNOSIS — E53.8 B12 DEFICIENCY: Primary | ICD-10-CM

## 2023-05-17 LAB
ALBUMIN SERPL-MCNC: 4.7 G/DL (ref 3.5–5.2)
ALBUMIN/GLOB SERPL: 2 G/DL
ALP SERPL-CCNC: 68 U/L (ref 39–117)
ALT SERPL-CCNC: 17 U/L (ref 1–33)
AST SERPL-CCNC: 16 U/L (ref 1–32)
BASOPHILS # BLD AUTO: 0.07 10*3/MM3 (ref 0–0.2)
BASOPHILS NFR BLD AUTO: 0.8 % (ref 0–1.5)
BILIRUB SERPL-MCNC: <0.2 MG/DL (ref 0–1.2)
BUN SERPL-MCNC: 15 MG/DL (ref 6–20)
BUN/CREAT SERPL: 20.3 (ref 7–25)
CALCIUM SERPL-MCNC: 9.6 MG/DL (ref 8.6–10.5)
CHLORIDE SERPL-SCNC: 102 MMOL/L (ref 98–107)
CHOLEST SERPL-MCNC: 186 MG/DL (ref 0–200)
CO2 SERPL-SCNC: 28.6 MMOL/L (ref 22–29)
CREAT SERPL-MCNC: 0.74 MG/DL (ref 0.57–1)
EGFRCR SERPLBLD CKD-EPI 2021: 114.6 ML/MIN/1.73
EOSINOPHIL # BLD AUTO: 0.09 10*3/MM3 (ref 0–0.4)
EOSINOPHIL NFR BLD AUTO: 1 % (ref 0.3–6.2)
ERYTHROCYTE [DISTWIDTH] IN BLOOD BY AUTOMATED COUNT: 12.3 % (ref 12.3–15.4)
FERRITIN SERPL-MCNC: 54.1 NG/ML (ref 13–150)
GLOBULIN SER CALC-MCNC: 2.3 GM/DL
GLUCOSE SERPL-MCNC: 100 MG/DL (ref 65–99)
HCT VFR BLD AUTO: 38.8 % (ref 34–46.6)
HDLC SERPL-MCNC: 60 MG/DL (ref 40–60)
HGB BLD-MCNC: 12.8 G/DL (ref 12–15.9)
IMM GRANULOCYTES # BLD AUTO: 0.02 10*3/MM3 (ref 0–0.05)
IMM GRANULOCYTES NFR BLD AUTO: 0.2 % (ref 0–0.5)
LDLC SERPL CALC-MCNC: 102 MG/DL (ref 0–100)
LYMPHOCYTES # BLD AUTO: 2.88 10*3/MM3 (ref 0.7–3.1)
LYMPHOCYTES NFR BLD AUTO: 31.2 % (ref 19.6–45.3)
MCH RBC QN AUTO: 28 PG (ref 26.6–33)
MCHC RBC AUTO-ENTMCNC: 33 G/DL (ref 31.5–35.7)
MCV RBC AUTO: 84.9 FL (ref 79–97)
MONOCYTES # BLD AUTO: 0.76 10*3/MM3 (ref 0.1–0.9)
MONOCYTES NFR BLD AUTO: 8.2 % (ref 5–12)
NEUTROPHILS # BLD AUTO: 5.4 10*3/MM3 (ref 1.7–7)
NEUTROPHILS NFR BLD AUTO: 58.6 % (ref 42.7–76)
NRBC BLD AUTO-RTO: 0 /100 WBC (ref 0–0.2)
PLATELET # BLD AUTO: 331 10*3/MM3 (ref 140–450)
POTASSIUM SERPL-SCNC: 4 MMOL/L (ref 3.5–5.2)
PROT SERPL-MCNC: 7 G/DL (ref 6–8.5)
RBC # BLD AUTO: 4.57 10*6/MM3 (ref 3.77–5.28)
SODIUM SERPL-SCNC: 139 MMOL/L (ref 136–145)
TRIGL SERPL-MCNC: 136 MG/DL (ref 0–150)
TSH SERPL DL<=0.005 MIU/L-ACNC: 1.09 UIU/ML (ref 0.27–4.2)
VIT B12 SERPL-MCNC: 410 PG/ML (ref 211–946)
VLDLC SERPL CALC-MCNC: 24 MG/DL (ref 5–40)
WBC # BLD AUTO: 9.22 10*3/MM3 (ref 3.4–10.8)

## 2023-05-22 ENCOUNTER — OFFICE VISIT (OUTPATIENT)
Dept: INTERNAL MEDICINE | Facility: CLINIC | Age: 28
End: 2023-05-22
Payer: COMMERCIAL

## 2023-05-22 VITALS — HEIGHT: 66 IN | BODY MASS INDEX: 31.18 KG/M2 | WEIGHT: 194 LBS | TEMPERATURE: 98.1 F

## 2023-05-22 DIAGNOSIS — E06.3 HYPOTHYROIDISM DUE TO HASHIMOTO'S THYROIDITIS: ICD-10-CM

## 2023-05-22 DIAGNOSIS — Z00.00 HEALTHCARE MAINTENANCE: ICD-10-CM

## 2023-05-22 DIAGNOSIS — M25.50 ARTHRALGIA, UNSPECIFIED JOINT: ICD-10-CM

## 2023-05-22 DIAGNOSIS — F41.9 ANXIETY: Primary | ICD-10-CM

## 2023-05-22 DIAGNOSIS — E53.8 B12 DEFICIENCY: ICD-10-CM

## 2023-05-22 DIAGNOSIS — E03.8 HYPOTHYROIDISM DUE TO HASHIMOTO'S THYROIDITIS: ICD-10-CM

## 2023-05-22 DIAGNOSIS — E06.3 HASHIMOTO'S DISEASE: ICD-10-CM

## 2023-05-22 RX ORDER — DULOXETIN HYDROCHLORIDE 60 MG/1
60 CAPSULE, DELAYED RELEASE ORAL DAILY
Qty: 90 CAPSULE | Refills: 1 | Status: SHIPPED | OUTPATIENT
Start: 2023-05-22

## 2023-05-22 RX ADMIN — CYANOCOBALAMIN 1000 MCG: 1000 INJECTION, SOLUTION INTRAMUSCULAR; SUBCUTANEOUS at 14:57

## 2023-05-22 NOTE — PROGRESS NOTES
Subjective   Chief Complaint   Patient presents with   • Annual Exam       History of Present Illness     Pt is here today for CPE. Had a tattoo on Friday( 3 days ago) worked the following day. It became swollen and red as the day went on. Entire upper arm was in pain at the time. The whole tattoo started bublling around the edge. Woke up the next morning and has been using sanaderm. She had a red nodule appear on her left inner shin as well. The redness has dissipated.     Has not had B12 injection in 2 months. Has done well switching from Fluoxetine to Duloxetine. Has not noticed any systemic sx improvement yet from switching.      Patient Active Problem List   Diagnosis   • Anxiety   • Hypothyroidism   • Hashimoto's disease   • Arthralgia       Allergies   Allergen Reactions   • Cumin Oil Anaphylaxis   • Corn Oil Nausea And Vomiting   • Gluten Meal Nausea And Vomiting   • Latex Rash   • Spironolactone Hives, Diarrhea and Nausea And Vomiting       Current Outpatient Medications on File Prior to Visit   Medication Sig Dispense Refill   • ferrous sulfate (FeroSul) 325 (65 FE) MG tablet Take 1 tablet by mouth Daily. 90 tablet 3   • imiquimod (ALDARA) 5 % cream Apply  topically to the appropriate area as directed Daily As Needed.     • levothyroxine (SYNTHROID, LEVOTHROID) 75 MCG tablet Take 1 tablet by mouth.     • Sprintec 28 0.25-35 MG-MCG per tablet Take 1 tablet by mouth Daily.     • vitamin B-12 (CYANOCOBALAMIN) 1000 MCG tablet Take 1 tablet by mouth Daily.     • [DISCONTINUED] DULoxetine (Cymbalta) 30 MG capsule Take 1 capsule by mouth Daily. 90 capsule 1   • [DISCONTINUED] FLUoxetine (PROzac) 10 MG capsule Take 1 capsule by mouth Daily. (Patient not taking: Reported on 5/22/2023) 14 capsule 0   • [DISCONTINUED] FLUoxetine (PROzac) 20 MG capsule Take 1 capsule by mouth Daily. (Patient not taking: Reported on 5/22/2023) 14 capsule 0     Current Facility-Administered Medications on File Prior to Visit  "  Medication Dose Route Frequency Provider Last Rate Last Admin   • cyanocobalamin injection 1,000 mcg  1,000 mcg Intramuscular Q28 Days Arielle Mendez PA-C   1,000 mcg at 05/22/23 1457       Past Medical History:   Diagnosis Date   • Hashimoto's disease        Family History   Problem Relation Age of Onset   • Hypertension Mother    • Hyperlipidemia Mother    • Thyroid disease Mother    • Hypertension Father    • Hyperlipidemia Father    • Thyroid disease Maternal Aunt    • Cancer Maternal Grandmother         breast   • Alzheimer's disease Maternal Grandmother         60's   • Cancer Maternal Grandfather         thyroid       Social History     Socioeconomic History   • Marital status: Single   Tobacco Use   • Smoking status: Never   • Smokeless tobacco: Never   Vaping Use   • Vaping Use: Never used   Substance and Sexual Activity   • Alcohol use: Never   • Drug use: Never   • Sexual activity: Defer       Past Surgical History:   Procedure Laterality Date   • WISDOM TOOTH EXTRACTION           The following portions of the patient's history were reviewed and updated as appropriate: problem list, allergies, current medications, past medical history, past family history, past social history and past surgical history.    ROS    See HPI  Immunization History   Administered Date(s) Administered   • COVID-19 (MODERNA) 1st,2nd,3rd Dose Monovalent 04/14/2021, 05/12/2021, 12/22/2021   • HPV Quadrivalent 11/21/2013   • Influenza, Unspecified 01/05/2019   • Tdap 03/05/2020       Objective   Vitals:    05/22/23 1430   Temp: 98.1 °F (36.7 °C)   Weight: 88 kg (194 lb)   Height: 167.6 cm (65.98\")     Body mass index is 31.33 kg/m².  Physical Exam  Vitals reviewed.   Constitutional:       Appearance: Normal appearance.   HENT:      Head: Normocephalic and atraumatic.      Right Ear: Ear canal normal.      Left Ear: Ear canal normal.   Cardiovascular:      Rate and Rhythm: Normal rate and regular rhythm.      Heart sounds: Normal " heart sounds.   Pulmonary:      Effort: Pulmonary effort is normal.      Breath sounds: Normal breath sounds.   Skin:     Comments: Mild amount of erythema around border of tape on left upper extremity. No signs of infection   Neurological:      General: No focal deficit present.      Mental Status: She is alert and oriented to person, place, and time.   Psychiatric:         Mood and Affect: Mood normal.         Behavior: Behavior normal.         Thought Content: Thought content normal.         Judgment: Judgment normal.           Assessment & Plan   Diagnoses and all orders for this visit:    1. Anxiety (Primary)    2. B12 deficiency    3. Arthralgia, unspecified joint    4. Hashimoto's disease    5. Hypothyroidism due to Hashimoto's thyroiditis    6. Healthcare maintenance    Other orders  -     DULoxetine (Cymbalta) 60 MG capsule; Take 1 capsule by mouth Daily.  Dispense: 90 capsule; Refill: 1    Increase Duloxetine from 30 mg to 60 mg daily. Will send [employer] message in 4 weeks regarding sx improvement. Plan to increase to 90 mg at that time if still having systemic joint discomfort. TSH is therapeutic. B12 still low normal, injection given today. Get back on protocol schedule. Recommended healthy diet, and 150 min of aerobic exercise per week      Return in about 6 months (around 11/22/2023) for Lab Appt Before FUP.

## 2023-08-04 RX ORDER — DULOXETIN HYDROCHLORIDE 30 MG/1
CAPSULE, DELAYED RELEASE ORAL
Qty: 90 CAPSULE | Refills: 1 | OUTPATIENT
Start: 2023-08-04

## 2023-10-30 RX ORDER — DULOXETIN HYDROCHLORIDE 60 MG/1
60 CAPSULE, DELAYED RELEASE ORAL DAILY
Qty: 90 CAPSULE | Refills: 0 | Status: SHIPPED | OUTPATIENT
Start: 2023-10-30

## 2023-11-13 DIAGNOSIS — E06.3 HYPOTHYROIDISM DUE TO HASHIMOTO'S THYROIDITIS: ICD-10-CM

## 2023-11-13 DIAGNOSIS — E03.8 HYPOTHYROIDISM DUE TO HASHIMOTO'S THYROIDITIS: ICD-10-CM

## 2023-11-13 DIAGNOSIS — E06.3 HASHIMOTO'S DISEASE: ICD-10-CM

## 2023-11-13 DIAGNOSIS — E53.8 B12 DEFICIENCY: Primary | ICD-10-CM

## 2023-11-14 LAB
ALBUMIN SERPL-MCNC: 4.4 G/DL (ref 4–5)
ALBUMIN/GLOB SERPL: 1.6 {RATIO} (ref 1.2–2.2)
ALP SERPL-CCNC: 69 IU/L (ref 44–121)
ALT SERPL-CCNC: 13 IU/L (ref 0–32)
AST SERPL-CCNC: 18 IU/L (ref 0–40)
BILIRUB SERPL-MCNC: 0.2 MG/DL (ref 0–1.2)
BUN SERPL-MCNC: 14 MG/DL (ref 6–20)
BUN/CREAT SERPL: 21 (ref 9–23)
CALCIUM SERPL-MCNC: 9.1 MG/DL (ref 8.7–10.2)
CHLORIDE SERPL-SCNC: 101 MMOL/L (ref 96–106)
CHOLEST SERPL-MCNC: 188 MG/DL (ref 100–199)
CO2 SERPL-SCNC: 21 MMOL/L (ref 20–29)
CREAT SERPL-MCNC: 0.66 MG/DL (ref 0.57–1)
EGFRCR SERPLBLD CKD-EPI 2021: 124 ML/MIN/1.73
GLOBULIN SER CALC-MCNC: 2.8 G/DL (ref 1.5–4.5)
GLUCOSE SERPL-MCNC: 78 MG/DL (ref 70–99)
HDLC SERPL-MCNC: 59 MG/DL
LDLC SERPL CALC-MCNC: 113 MG/DL (ref 0–99)
POTASSIUM SERPL-SCNC: 4.3 MMOL/L (ref 3.5–5.2)
PROT SERPL-MCNC: 7.2 G/DL (ref 6–8.5)
SODIUM SERPL-SCNC: 138 MMOL/L (ref 134–144)
TRIGL SERPL-MCNC: 90 MG/DL (ref 0–149)
TSH SERPL DL<=0.005 MIU/L-ACNC: 1.32 UIU/ML (ref 0.45–4.5)
VIT B12 SERPL-MCNC: 610 PG/ML (ref 232–1245)
VLDLC SERPL CALC-MCNC: 16 MG/DL (ref 5–40)

## 2023-11-20 ENCOUNTER — OFFICE VISIT (OUTPATIENT)
Dept: INTERNAL MEDICINE | Facility: CLINIC | Age: 28
End: 2023-11-20
Payer: COMMERCIAL

## 2023-11-20 VITALS
TEMPERATURE: 98.1 F | DIASTOLIC BLOOD PRESSURE: 60 MMHG | BODY MASS INDEX: 32.47 KG/M2 | HEIGHT: 66 IN | SYSTOLIC BLOOD PRESSURE: 110 MMHG | WEIGHT: 202 LBS

## 2023-11-20 DIAGNOSIS — Z00.00 HEALTHCARE MAINTENANCE: ICD-10-CM

## 2023-11-20 DIAGNOSIS — F41.9 ANXIETY: Primary | ICD-10-CM

## 2023-11-20 RX ORDER — DULOXETIN HYDROCHLORIDE 60 MG/1
60 CAPSULE, DELAYED RELEASE ORAL DAILY
Qty: 90 CAPSULE | Refills: 3 | Status: SHIPPED | OUTPATIENT
Start: 2023-11-20

## 2023-11-20 RX ORDER — NORETHINDRONE ACETATE AND ETHINYL ESTRADIOL, AND FERROUS FUMARATE 1MG-20(24)
1 KIT ORAL DAILY
COMMUNITY
Start: 2023-09-01

## 2023-11-20 NOTE — PROGRESS NOTES
Subjective   Chief Complaint   Patient presents with    Anxiety     F/U       History of Present Illness     Pt is here today for fup on her anxiety. Her sx are well controlled. She feels her joint symptoms are a little better. She would like to stay at her current dose.      Patient Active Problem List   Diagnosis    Anxiety    Hypothyroidism    Hashimoto's disease    Arthralgia       Allergies   Allergen Reactions    Cumin Oil Anaphylaxis    Corn Oil Nausea And Vomiting    Gluten Meal Nausea And Vomiting    Latex Rash    Spironolactone Hives, Diarrhea and Nausea And Vomiting       Current Outpatient Medications on File Prior to Visit   Medication Sig Dispense Refill    ferrous sulfate (FeroSul) 325 (65 FE) MG tablet Take 1 tablet by mouth Daily. 90 tablet 3    Magnolia 24 Fe 1-20 MG-MCG(24) per tablet Take 1 tablet by mouth Daily.      imiquimod (ALDARA) 5 % cream Apply  topically to the appropriate area as directed Daily As Needed.      levothyroxine (SYNTHROID, LEVOTHROID) 75 MCG tablet Take 1 tablet by mouth.      vitamin B-12 (CYANOCOBALAMIN) 1000 MCG tablet Take 1 tablet by mouth Daily.      [DISCONTINUED] DULoxetine (CYMBALTA) 60 MG capsule TAKE ONE CAPSULE BY MOUTH ONE TIME DAILY 90 capsule 0    [DISCONTINUED] Sprintec 28 0.25-35 MG-MCG per tablet Take 1 tablet by mouth Daily.       Current Facility-Administered Medications on File Prior to Visit   Medication Dose Route Frequency Provider Last Rate Last Admin    cyanocobalamin injection 1,000 mcg  1,000 mcg Intramuscular Q28 Days Arielle Mendez PA-C   1,000 mcg at 05/22/23 0104       Past Medical History:   Diagnosis Date    Hashimoto's disease        Family History   Problem Relation Age of Onset    Hypertension Mother     Hyperlipidemia Mother     Thyroid disease Mother     Hypertension Father     Hyperlipidemia Father     Thyroid disease Maternal Aunt     Cancer Maternal Grandmother         breast    Alzheimer's disease Maternal Grandmother         60's     "Cancer Maternal Grandfather         thyroid       Social History     Socioeconomic History    Marital status: Single   Tobacco Use    Smoking status: Never    Smokeless tobacco: Never   Vaping Use    Vaping Use: Never used   Substance and Sexual Activity    Alcohol use: Never    Drug use: Never    Sexual activity: Defer       Past Surgical History:   Procedure Laterality Date    WISDOM TOOTH EXTRACTION           The following portions of the patient's history were reviewed and updated as appropriate: problem list, allergies, current medications, past medical history, past family history, past social history, and past surgical history.    ROS    See HPI    Immunization History   Administered Date(s) Administered    COVID-19 (MODERNA) 1st,2nd,3rd Dose Monovalent 04/14/2021, 05/12/2021, 12/22/2021    HPV Quadrivalent 11/21/2013    Influenza, Unspecified 01/05/2019    Tdap 03/05/2020       Objective   Vitals:    11/20/23 1507   BP: 110/60   Temp: 98.1 °F (36.7 °C)   Weight: 91.6 kg (202 lb)   Height: 167.6 cm (65.98\")     Body mass index is 32.62 kg/m².  Physical Exam  Vitals reviewed.   Constitutional:       Appearance: Normal appearance.   HENT:      Head: Normocephalic and atraumatic.   Cardiovascular:      Rate and Rhythm: Normal rate and regular rhythm.   Neurological:      Mental Status: She is alert.       Assessment & Plan   Diagnoses and all orders for this visit:    1. Anxiety (Primary)    2. Healthcare maintenance  -     CBC & Differential; Future  -     Comprehensive Metabolic Panel; Future  -     Lipid Panel With / Chol / HDL Ratio; Future    Other orders  -     DULoxetine (CYMBALTA) 60 MG capsule; Take 1 capsule by mouth Daily.  Dispense: 90 capsule; Refill: 3     Continue current dose of Duloxetine, will increase if her joint sx worsen.     Return in about 1 year (around 11/20/2024) for Annual physical, Lab Appt Before FUP.           "

## 2023-12-15 RX ORDER — DULOXETIN HYDROCHLORIDE 60 MG/1
60 CAPSULE, DELAYED RELEASE ORAL DAILY
Qty: 90 CAPSULE | Refills: 0 | Status: SHIPPED | OUTPATIENT
Start: 2023-12-15

## 2024-03-04 RX ORDER — DULOXETIN HYDROCHLORIDE 60 MG/1
60 CAPSULE, DELAYED RELEASE ORAL DAILY
Qty: 90 CAPSULE | Refills: 0 | Status: SHIPPED | OUTPATIENT
Start: 2024-03-04

## 2024-03-11 ENCOUNTER — OFFICE VISIT (OUTPATIENT)
Dept: INTERNAL MEDICINE | Facility: CLINIC | Age: 29
End: 2024-03-11
Payer: COMMERCIAL

## 2024-03-11 VITALS
SYSTOLIC BLOOD PRESSURE: 130 MMHG | TEMPERATURE: 98.1 F | DIASTOLIC BLOOD PRESSURE: 80 MMHG | WEIGHT: 205 LBS | HEIGHT: 66 IN | BODY MASS INDEX: 32.95 KG/M2

## 2024-03-11 DIAGNOSIS — L02.91 ABSCESS: Primary | ICD-10-CM

## 2024-03-11 PROCEDURE — 99213 OFFICE O/P EST LOW 20 MIN: CPT | Performed by: PHYSICIAN ASSISTANT

## 2024-03-11 RX ORDER — DOXYCYCLINE HYCLATE 100 MG/1
100 CAPSULE ORAL 2 TIMES DAILY
Qty: 14 CAPSULE | Refills: 0 | Status: SHIPPED | OUTPATIENT
Start: 2024-03-11 | End: 2024-03-18

## 2024-03-11 NOTE — PROGRESS NOTES
Subjective   Chief Complaint   Patient presents with    Insect Bite     At belly button       History of Present Illness      Went to [place] 2 weeks ago, had a bite on her abdomen. Thought it was getting better, then 2 days ago started increasing in size. Now tender and started draining last night.     Patient Active Problem List   Diagnosis    Anxiety    Hypothyroidism    Hashimoto's disease    Arthralgia       Allergies   Allergen Reactions    Cumin Oil Anaphylaxis    Corn Oil Nausea And Vomiting    Gluten Meal Nausea And Vomiting    Latex Rash    Spironolactone Hives, Diarrhea and Nausea And Vomiting       Current Outpatient Medications on File Prior to Visit   Medication Sig Dispense Refill    DULoxetine (CYMBALTA) 60 MG capsule Take 1 capsule by mouth Daily. 90 capsule 0    ferrous sulfate (FeroSul) 325 (65 FE) MG tablet Take 1 tablet by mouth Daily. 90 tablet 3    Magnolia 24 Fe 1-20 MG-MCG(24) per tablet Take 1 tablet by mouth Daily.      imiquimod (ALDARA) 5 % cream Apply  topically to the appropriate area as directed Daily As Needed.      levothyroxine (SYNTHROID, LEVOTHROID) 75 MCG tablet Take 1 tablet by mouth.      vitamin B-12 (CYANOCOBALAMIN) 1000 MCG tablet Take 1 tablet by mouth Daily.       Current Facility-Administered Medications on File Prior to Visit   Medication Dose Route Frequency Provider Last Rate Last Admin    cyanocobalamin injection 1,000 mcg  1,000 mcg Intramuscular Q28 Days Arielle Mendez PA-C   1,000 mcg at 05/22/23 5713       Past Medical History:   Diagnosis Date    Hashimoto's disease        Family History   Problem Relation Age of Onset    Hypertension Mother     Hyperlipidemia Mother     Thyroid disease Mother     Hypertension Father     Hyperlipidemia Father     Thyroid disease Maternal Aunt     Cancer Maternal Grandmother         breast    Alzheimer's disease Maternal Grandmother         60's    Cancer Maternal Grandfather         thyroid       Social History     Socioeconomic  "History    Marital status: Single   Tobacco Use    Smoking status: Never    Smokeless tobacco: Never   Vaping Use    Vaping status: Never Used   Substance and Sexual Activity    Alcohol use: Never    Drug use: Never    Sexual activity: Defer       Past Surgical History:   Procedure Laterality Date    WISDOM TOOTH EXTRACTION           The following portions of the patient's history were reviewed and updated as appropriate: problem list, allergies, current medications, past medical history, past family history, past social history, and past surgical history.    ROS    See HPI    Immunization History   Administered Date(s) Administered    COVID-19 (MODERNA) 1st,2nd,3rd Dose Monovalent 04/14/2021, 05/12/2021, 12/22/2021    HPV Quadrivalent 11/21/2013    Influenza, Unspecified 01/05/2019    Tdap 03/05/2020       Objective   Vitals:    03/11/24 1508   BP: 130/80   Temp: 98.1 °F (36.7 °C)   Weight: 93 kg (205 lb)   Height: 167.6 cm (65.98\")     Body mass index is 33.1 kg/m².  Physical Exam  Vitals reviewed.   Constitutional:       Appearance: Normal appearance.   Skin:     Comments: Fluctuant and raised 0.5 cm abscess   Neurological:      Mental Status: She is alert.           Assessment & Plan   Diagnoses and all orders for this visit:    1. Abscess (Primary)  -     doxycycline (VIBRAMYCIN) 100 MG capsule; Take 1 capsule by mouth 2 (Two) Times a Day for 7 days.  Dispense: 14 capsule; Refill: 0     Use warm compresses and txt as above.     No follow-ups on file.           "

## 2024-05-28 RX ORDER — DULOXETIN HYDROCHLORIDE 60 MG/1
60 CAPSULE, DELAYED RELEASE ORAL DAILY
Qty: 90 CAPSULE | Refills: 0 | Status: SHIPPED | OUTPATIENT
Start: 2024-05-28

## 2024-06-11 ENCOUNTER — OFFICE VISIT (OUTPATIENT)
Dept: INTERNAL MEDICINE | Facility: CLINIC | Age: 29
End: 2024-06-11
Payer: COMMERCIAL

## 2024-06-11 VITALS
TEMPERATURE: 98.6 F | WEIGHT: 205 LBS | DIASTOLIC BLOOD PRESSURE: 74 MMHG | BODY MASS INDEX: 32.95 KG/M2 | SYSTOLIC BLOOD PRESSURE: 116 MMHG | HEIGHT: 66 IN

## 2024-06-11 DIAGNOSIS — H81.13 BENIGN PAROXYSMAL POSITIONAL VERTIGO DUE TO BILATERAL VESTIBULAR DISORDER: Primary | ICD-10-CM

## 2024-06-11 PROCEDURE — 99213 OFFICE O/P EST LOW 20 MIN: CPT | Performed by: PHYSICIAN ASSISTANT

## 2024-06-11 NOTE — PROGRESS NOTES
Subjective   Chief Complaint   Patient presents with    Dizziness     X2 weeks off and on       History of Present Illness     Pt is here today with dizziness x 8 days. Woke up a week ago with sx. Felt the need to balance herself while at work. Has improved since onset today. If she turned her head at the beginning it causes her symptoms and now just with eye movement. She tried the epley maneuver her mom showed her. Woke up the following day and still just as bad. She then tried some otc motion sickness medication. Took the motion sickness medication and it caused her to wet the bed. She stopped it and the bed wetting stopped. Dizziness was better while taking the medication. She has had some nausea as well associated with her symptoms. She is having abdominal discomfort after eating and having diarrhea after eating for the last week. She is trying to stay hydrated. She was dog sitting for her brother the week prior to onset of sx.      Patient Active Problem List   Diagnosis    Anxiety    Hypothyroidism    Hashimoto's disease    Arthralgia       Allergies   Allergen Reactions    Cumin Oil Anaphylaxis    Corn Oil Nausea And Vomiting    Gluten Meal Nausea And Vomiting    Latex Rash    Spironolactone Hives, Diarrhea and Nausea And Vomiting       Current Outpatient Medications on File Prior to Visit   Medication Sig Dispense Refill    DULoxetine (CYMBALTA) 60 MG capsule Take 1 capsule by mouth Daily. 90 capsule 0    ferrous sulfate (FeroSul) 325 (65 FE) MG tablet Take 1 tablet by mouth Daily. 90 tablet 3    Magnolia 24 Fe 1-20 MG-MCG(24) per tablet Take 1 tablet by mouth Daily.      imiquimod (ALDARA) 5 % cream Apply  topically to the appropriate area as directed Daily As Needed.      levothyroxine (SYNTHROID, LEVOTHROID) 75 MCG tablet Take 1 tablet by mouth.      vitamin B-12 (CYANOCOBALAMIN) 1000 MCG tablet Take 1 tablet by mouth Daily.       Current Facility-Administered Medications on File Prior to Visit  "  Medication Dose Route Frequency Provider Last Rate Last Admin    cyanocobalamin injection 1,000 mcg  1,000 mcg Intramuscular Q28 Days Arielle Mendez PA-C   1,000 mcg at 05/22/23 1457       Past Medical History:   Diagnosis Date    Hashimoto's disease        Family History   Problem Relation Age of Onset    Hypertension Mother     Hyperlipidemia Mother     Thyroid disease Mother     Hypertension Father     Hyperlipidemia Father     Thyroid disease Maternal Aunt     Cancer Maternal Grandmother         breast    Alzheimer's disease Maternal Grandmother         60's    Cancer Maternal Grandfather         thyroid       Social History     Socioeconomic History    Marital status: Single   Tobacco Use    Smoking status: Never    Smokeless tobacco: Never   Vaping Use    Vaping status: Never Used   Substance and Sexual Activity    Alcohol use: Never    Drug use: Never    Sexual activity: Defer       Past Surgical History:   Procedure Laterality Date    WISDOM TOOTH EXTRACTION           The following portions of the patient's history were reviewed and updated as appropriate: problem list, allergies, current medications, past medical history, past family history, past social history, and past surgical history.    ROS    See HPI    Immunization History   Administered Date(s) Administered    COVID-19 (MODERNA) 1st,2nd,3rd Dose Monovalent 04/14/2021, 05/12/2021, 12/22/2021    HPV Quadrivalent 11/21/2013    Influenza, Unspecified 01/05/2019    Tdap 03/05/2020       Objective   Vitals:    06/11/24 1352   Temp: 98.6 °F (37 °C)   Weight: 93 kg (205 lb)   Height: 167.6 cm (65.98\")     Body mass index is 33.1 kg/m².  Physical Exam  Vitals reviewed.   Constitutional:       Appearance: She is well-developed.   HENT:      Head: Normocephalic and atraumatic. Head contusion: positive agata hallpike.   Cardiovascular:      Rate and Rhythm: Normal rate and regular rhythm.      Heart sounds: Normal heart sounds, S1 normal and S2 normal. "   Pulmonary:      Effort: Pulmonary effort is normal.      Breath sounds: Normal breath sounds.   Skin:     General: Skin is warm.   Neurological:      Mental Status: She is alert.   Psychiatric:         Behavior: Behavior normal.           Assessment & Plan   Diagnoses and all orders for this visit:    1. Benign paroxysmal positional vertigo due to bilateral vestibular disorder (Primary)  -     Ambulatory Referral to Physical Therapy for Evaluation & Treatment     Suspect allergens set off sx. Increase her oral antihistamine to twice daily for 1-2 weeks. Will have PT perform Epley. Would avoid Meclizine due to se of dramamine.

## 2024-06-28 DIAGNOSIS — R14.0 BLOATING: Primary | ICD-10-CM

## 2024-06-28 DIAGNOSIS — R19.7 DIARRHEA, UNSPECIFIED TYPE: ICD-10-CM

## 2024-08-14 RX ORDER — DULOXETIN HYDROCHLORIDE 60 MG/1
60 CAPSULE, DELAYED RELEASE ORAL DAILY
Qty: 90 CAPSULE | Refills: 0 | Status: SHIPPED | OUTPATIENT
Start: 2024-08-14

## 2024-11-06 DIAGNOSIS — Z00.00 HEALTHCARE MAINTENANCE: ICD-10-CM

## 2024-11-15 LAB
ALBUMIN SERPL-MCNC: 4.2 G/DL (ref 3.5–5.2)
ALBUMIN/GLOB SERPL: 1.8 G/DL
ALP SERPL-CCNC: 84 U/L (ref 39–117)
ALT SERPL-CCNC: 36 U/L (ref 1–33)
AST SERPL-CCNC: 28 U/L (ref 1–32)
BASOPHILS # BLD AUTO: 0.05 10*3/MM3 (ref 0–0.2)
BASOPHILS NFR BLD AUTO: 0.9 % (ref 0–1.5)
BILIRUB SERPL-MCNC: 0.3 MG/DL (ref 0–1.2)
BUN SERPL-MCNC: 12 MG/DL (ref 6–20)
BUN/CREAT SERPL: 18.8 (ref 7–25)
CALCIUM SERPL-MCNC: 9.1 MG/DL (ref 8.6–10.5)
CHLORIDE SERPL-SCNC: 103 MMOL/L (ref 98–107)
CHOLEST SERPL-MCNC: 198 MG/DL (ref 0–200)
CHOLEST/HDLC SERPL: 3.05 {RATIO}
CO2 SERPL-SCNC: 26.1 MMOL/L (ref 22–29)
CREAT SERPL-MCNC: 0.64 MG/DL (ref 0.57–1)
EGFRCR SERPLBLD CKD-EPI 2021: 124.4 ML/MIN/1.73
EOSINOPHIL # BLD AUTO: 0.14 10*3/MM3 (ref 0–0.4)
EOSINOPHIL NFR BLD AUTO: 2.4 % (ref 0.3–6.2)
ERYTHROCYTE [DISTWIDTH] IN BLOOD BY AUTOMATED COUNT: 12.4 % (ref 12.3–15.4)
GLOBULIN SER CALC-MCNC: 2.4 GM/DL
GLUCOSE SERPL-MCNC: 85 MG/DL (ref 65–99)
HCT VFR BLD AUTO: 39.7 % (ref 34–46.6)
HDLC SERPL-MCNC: 65 MG/DL (ref 40–60)
HGB BLD-MCNC: 13.3 G/DL (ref 12–15.9)
IMM GRANULOCYTES # BLD AUTO: 0.02 10*3/MM3 (ref 0–0.05)
IMM GRANULOCYTES NFR BLD AUTO: 0.3 % (ref 0–0.5)
LDLC SERPL CALC-MCNC: 119 MG/DL (ref 0–100)
LYMPHOCYTES # BLD AUTO: 2.03 10*3/MM3 (ref 0.7–3.1)
LYMPHOCYTES NFR BLD AUTO: 35.4 % (ref 19.6–45.3)
MCH RBC QN AUTO: 28.5 PG (ref 26.6–33)
MCHC RBC AUTO-ENTMCNC: 33.5 G/DL (ref 31.5–35.7)
MCV RBC AUTO: 85 FL (ref 79–97)
MONOCYTES # BLD AUTO: 0.71 10*3/MM3 (ref 0.1–0.9)
MONOCYTES NFR BLD AUTO: 12.4 % (ref 5–12)
NEUTROPHILS # BLD AUTO: 2.79 10*3/MM3 (ref 1.7–7)
NEUTROPHILS NFR BLD AUTO: 48.6 % (ref 42.7–76)
NRBC BLD AUTO-RTO: 0 /100 WBC (ref 0–0.2)
PLATELET # BLD AUTO: 330 10*3/MM3 (ref 140–450)
POTASSIUM SERPL-SCNC: 4.2 MMOL/L (ref 3.5–5.2)
PROT SERPL-MCNC: 6.6 G/DL (ref 6–8.5)
RBC # BLD AUTO: 4.67 10*6/MM3 (ref 3.77–5.28)
SODIUM SERPL-SCNC: 138 MMOL/L (ref 136–145)
TRIGL SERPL-MCNC: 76 MG/DL (ref 0–150)
VLDLC SERPL CALC-MCNC: 14 MG/DL (ref 5–40)
WBC # BLD AUTO: 5.74 10*3/MM3 (ref 3.4–10.8)

## 2024-11-19 ENCOUNTER — LAB (OUTPATIENT)
Facility: HOSPITAL | Age: 29
End: 2024-11-19
Payer: COMMERCIAL

## 2024-11-19 DIAGNOSIS — R35.0 FREQUENT URINATION: Primary | ICD-10-CM

## 2024-11-19 LAB
BILIRUB UR QL STRIP: NEGATIVE
CLARITY UR: CLEAR
COLOR UR: YELLOW
GLUCOSE UR STRIP-MCNC: NEGATIVE MG/DL
HGB UR QL STRIP.AUTO: NEGATIVE
HOLD SPECIMEN: NORMAL
KETONES UR QL STRIP: NEGATIVE
LEUKOCYTE ESTERASE UR QL STRIP.AUTO: NEGATIVE
NITRITE UR QL STRIP: NEGATIVE
PH UR STRIP.AUTO: 6 [PH] (ref 5–8)
PROT UR QL STRIP: NEGATIVE
SP GR UR STRIP: 1.03 (ref 1–1.03)
UROBILINOGEN UR QL STRIP: NORMAL

## 2024-11-19 PROCEDURE — 81003 URINALYSIS AUTO W/O SCOPE: CPT | Performed by: PHYSICIAN ASSISTANT

## 2024-11-25 ENCOUNTER — OFFICE VISIT (OUTPATIENT)
Dept: INTERNAL MEDICINE | Facility: CLINIC | Age: 29
End: 2024-11-25
Payer: COMMERCIAL

## 2024-11-25 VITALS
WEIGHT: 204 LBS | TEMPERATURE: 98.6 F | DIASTOLIC BLOOD PRESSURE: 74 MMHG | HEIGHT: 66 IN | BODY MASS INDEX: 32.78 KG/M2 | SYSTOLIC BLOOD PRESSURE: 122 MMHG

## 2024-11-25 DIAGNOSIS — M25.50 ARTHRALGIA, UNSPECIFIED JOINT: ICD-10-CM

## 2024-11-25 DIAGNOSIS — Z00.00 ANNUAL PHYSICAL EXAM: Primary | ICD-10-CM

## 2024-11-25 DIAGNOSIS — F41.9 ANXIETY: ICD-10-CM

## 2024-11-25 PROCEDURE — 99395 PREV VISIT EST AGE 18-39: CPT | Performed by: PHYSICIAN ASSISTANT

## 2024-11-25 RX ORDER — DULOXETIN HYDROCHLORIDE 60 MG/1
60 CAPSULE, DELAYED RELEASE ORAL DAILY
Qty: 90 CAPSULE | Refills: 3 | Status: SHIPPED | OUTPATIENT
Start: 2024-11-25

## 2024-11-25 RX ORDER — DULOXETIN HYDROCHLORIDE 30 MG/1
30 CAPSULE, DELAYED RELEASE ORAL DAILY
Qty: 90 CAPSULE | Refills: 3 | Status: SHIPPED | OUTPATIENT
Start: 2024-11-25

## 2024-11-25 NOTE — PROGRESS NOTES
Subjective   Chief Complaint   Patient presents with    Anxiety    Annual Exam       History of Present Illness     Pt is here today for CPE. Has been having more joint symptoms, work has been busier also. Planning on restarting therapy for her anxiety. Looking for a new therapist who fits her work schedule. Sees GYN, is off OCP now. Periods are regular. TSH therapeutic.      Patient Active Problem List   Diagnosis    Anxiety    Hypothyroidism    Hashimoto's disease    Arthralgia       Allergies   Allergen Reactions    Cumin Oil Anaphylaxis    Corn Oil Nausea And Vomiting    Gluten Meal Nausea And Vomiting    Latex Rash    Spironolactone Hives, Diarrhea and Nausea And Vomiting       Current Outpatient Medications on File Prior to Visit   Medication Sig Dispense Refill    DULoxetine (CYMBALTA) 60 MG capsule TAKE ONE CAPSULE BY MOUTH ONCE DAILY 90 capsule 0    ferrous sulfate (FeroSul) 325 (65 FE) MG tablet Take 1 tablet by mouth Daily. 90 tablet 3    imiquimod (ALDARA) 5 % cream Apply  topically to the appropriate area as directed Daily As Needed.      levothyroxine (SYNTHROID, LEVOTHROID) 75 MCG tablet Take 1 tablet by mouth.      vitamin B-12 (CYANOCOBALAMIN) 1000 MCG tablet Take 1 tablet by mouth Daily.      [DISCONTINUED] Magnolia 24 Fe 1-20 MG-MCG(24) per tablet Take 1 tablet by mouth Daily.       Current Facility-Administered Medications on File Prior to Visit   Medication Dose Route Frequency Provider Last Rate Last Admin    cyanocobalamin injection 1,000 mcg  1,000 mcg Intramuscular Q28 Days Arielle Mendez PA-C   1,000 mcg at 05/22/23 6526       Past Medical History:   Diagnosis Date    Hashimoto's disease        Family History   Problem Relation Age of Onset    Hypertension Mother     Hyperlipidemia Mother     Thyroid disease Mother     Hypertension Father     Hyperlipidemia Father     Thyroid disease Maternal Aunt     Cancer Maternal Grandmother         breast    Alzheimer's disease Maternal Grandmother        "  60's    Cancer Maternal Grandfather         thyroid       Social History     Socioeconomic History    Marital status: Single   Tobacco Use    Smoking status: Never    Smokeless tobacco: Never   Vaping Use    Vaping status: Never Used   Substance and Sexual Activity    Alcohol use: Never    Drug use: Never    Sexual activity: Defer       Past Surgical History:   Procedure Laterality Date    WISDOM TOOTH EXTRACTION           The following portions of the patient's history were reviewed and updated as appropriate: problem list, allergies, current medications, past medical history, past family history, past social history, and past surgical history.    ROS    See HPI    Immunization History   Administered Date(s) Administered    COVID-19 (MODERNA) 1st,2nd,3rd Dose Monovalent 04/14/2021, 05/12/2021, 12/22/2021    HPV Quadrivalent 11/21/2013    Influenza, Unspecified 01/05/2019    Tdap 03/05/2020       Objective   Vitals:    11/25/24 1427   BP: 122/74   Temp: 98.6 °F (37 °C)   Weight: 92.5 kg (204 lb)   Height: 167.6 cm (65.98\")     Body mass index is 32.94 kg/m².  Physical Exam  Vitals and nursing note reviewed.   Constitutional:       Appearance: Normal appearance.   HENT:      Head: Normocephalic and atraumatic.      Right Ear: Tympanic membrane and ear canal normal.      Left Ear: Tympanic membrane and ear canal normal.      Nose: Nose normal.      Mouth/Throat:      Mouth: Mucous membranes are moist.      Pharynx: Oropharynx is clear.   Eyes:      Extraocular Movements: Extraocular movements intact.      Conjunctiva/sclera: Conjunctivae normal.      Pupils: Pupils are equal, round, and reactive to light.   Neck:      Thyroid: No thyromegaly.   Cardiovascular:      Rate and Rhythm: Normal rate and regular rhythm.      Heart sounds: Normal heart sounds. No murmur heard.  Pulmonary:      Effort: Pulmonary effort is normal.      Breath sounds: Normal breath sounds.   Abdominal:      General: Abdomen is flat. Bowel " sounds are normal.      Palpations: Abdomen is soft. There is no hepatomegaly or splenomegaly.      Tenderness: There is no abdominal tenderness. There is no guarding.   Musculoskeletal:      Cervical back: Neck supple.   Lymphadenopathy:      Cervical: No cervical adenopathy.   Skin:     General: Skin is warm and dry.   Neurological:      General: No focal deficit present.      Mental Status: She is alert and oriented to person, place, and time. Mental status is at baseline.      Gait: Gait normal.   Psychiatric:         Mood and Affect: Mood normal.         Behavior: Behavior normal.         Thought Content: Thought content normal.         Judgment: Judgment normal.           Assessment & Plan   Diagnoses and all orders for this visit:    1. Annual physical exam (Primary)  -     CBC & Differential; Future  -     Comprehensive Metabolic Panel; Future  -     Lipid Panel With / Chol / HDL Ratio; Future    2. Anxiety    3. Arthralgia, unspecified joint  -     DULoxetine (CYMBALTA) 60 MG capsule; Take 1 capsule by mouth Daily.  Dispense: 90 capsule; Refill: 3  -     DULoxetine (CYMBALTA) 30 MG capsule; Take 1 capsule by mouth Daily.  Dispense: 90 capsule; Refill: 3       Reviewed labs. Sees GYN, utd on pap/pelvic. Continues to see Endo for her hypothyroidism. Will increase her Duloxetine to 90 mg daily through winter to see if it helps with her joint sx. Recommended healthy diet, and 150 min of aerobic exercise per week. Vaccinations are utd.     Return in about 1 year (around 11/25/2025) for Annual physical, Lab Appt Before FUP.